# Patient Record
Sex: FEMALE | Race: BLACK OR AFRICAN AMERICAN | Employment: FULL TIME | URBAN - METROPOLITAN AREA
[De-identification: names, ages, dates, MRNs, and addresses within clinical notes are randomized per-mention and may not be internally consistent; named-entity substitution may affect disease eponyms.]

---

## 2022-04-28 ENCOUNTER — HOSPITAL ENCOUNTER (EMERGENCY)
Age: 22
Discharge: HOME | End: 2022-04-28
Payer: COMMERCIAL

## 2022-04-28 VITALS
DIASTOLIC BLOOD PRESSURE: 72 MMHG | SYSTOLIC BLOOD PRESSURE: 115 MMHG | HEART RATE: 98 BPM | RESPIRATION RATE: 16 BRPM | WEIGHT: 163.8 LBS | TEMPERATURE: 98.4 F | OXYGEN SATURATION: 100 %

## 2022-04-28 DIAGNOSIS — N63.0 BENIGN BREAST LUMPS: Primary | ICD-10-CM

## 2022-04-28 LAB — HCG(URINE) PREGNANCY TEST: NEGATIVE

## 2022-04-28 PROCEDURE — 99283 EMERGENCY DEPT VISIT LOW MDM: CPT

## 2022-04-28 PROCEDURE — 84703 CHORIONIC GONADOTROPIN ASSAY: CPT

## 2022-04-28 RX ORDER — IBUPROFEN 600 MG/1
600 TABLET ORAL EVERY 6 HOURS PRN
Qty: 20 TABLET | Refills: 0 | Status: SHIPPED | OUTPATIENT
Start: 2022-04-28

## 2022-04-28 ASSESSMENT — PAIN DESCRIPTION - LOCATION: LOCATION: BREAST

## 2022-04-28 ASSESSMENT — PAIN SCALES - GENERAL: PAINLEVEL_OUTOF10: 6

## 2022-04-28 ASSESSMENT — PAIN DESCRIPTION - ONSET: ONSET: ON-GOING

## 2022-04-28 ASSESSMENT — ENCOUNTER SYMPTOMS
COLOR CHANGE: 0
SHORTNESS OF BREATH: 0
CHEST TIGHTNESS: 0

## 2022-04-28 ASSESSMENT — PAIN DESCRIPTION - PAIN TYPE: TYPE: ACUTE PAIN

## 2022-04-28 ASSESSMENT — PAIN DESCRIPTION - ORIENTATION: ORIENTATION: LEFT;RIGHT

## 2022-04-28 ASSESSMENT — PAIN - FUNCTIONAL ASSESSMENT: PAIN_FUNCTIONAL_ASSESSMENT: ACTIVITIES ARE NOT PREVENTED

## 2022-04-28 ASSESSMENT — PAIN DESCRIPTION - DESCRIPTORS: DESCRIPTORS: SHARP

## 2022-04-28 ASSESSMENT — PAIN DESCRIPTION - FREQUENCY: FREQUENCY: INTERMITTENT

## 2022-04-29 NOTE — ED PROVIDER NOTES
[address] [facility]      Pt Name: George Montoya  MRN: [id number]  [place] 2000  Date of evaluation: 4/28/2022  Provider: BAM Diaz    This patient was not seen and evaluated by the attending physician No att. providers found. CHIEF COMPLAINT       Chief Complaint   Patient presents with    Breast Mass     Las lumps in [facility]; states she noticed them getting bigger at the beginning of the week; states she also had intermittent sharp pain in both breasts       CRITICAL CARE TIME   I performed a total Critical Care time of 15 minutes, excluding separately reportable procedures. There was a high probability of clinically significant/life threatening deterioration in the patient's condition which required my urgent intervention. Not limited to multiple reexaminations, discussions with attending physician and consultants. HISTORY OF PRESENT ILLNESS  (Location/Symptom, Timing/Onset, Context/Setting, Quality, Duration, Modifying Factors, Severity.)   George Montoya is a 22 y.o. female with no significant PMHx who presents via private vehicle  to the emergency department with breast masses. Pt says the breast masses have been present for years. But has never received medical attention for it. Recently she has been having sharp pain in the breasts associated with the masses which prompted her ED visit today. The mass on the right breast has become bigger than the left recently. Pt denies discharge, skin and nipple changes to the breast, fever, chills, n/v, diaphoresis. Pt says she was supposed to have a menstrual cycle around this time but has not yet. Pain 6/10. She has not taken anything at home for the pain. Nursing Notes were reviewed and I agree.     REVIEW OF SYSTEMS    (2-9 systems for level 4, 10 or more for level 5)     Review of Systems   Constitutional: Negative for activity change, chills, diaphoresis, fatigue and fever.   Respiratory: Negative for chest tightness and shortness of breath. Cardiovascular: Positive for chest pain (Breast pain associated with lumps). Negative for palpitations. Musculoskeletal: Negative for myalgias. Skin: Negative for color change, pallor, rash and wound. Neurological: Negative for weakness. Psychiatric/Behavioral: Negative for agitation and behavioral problems. Except as noted above the remainder of the review of systems was reviewed and negative. PAST MEDICAL HISTORY   No past medical history on file. SURGICAL HISTORY     No past surgical history on file. CURRENT MEDICATIONS       Previous Medications    No medications on file       ALLERGIES     Patient has no known allergies. FAMILY HISTORY     No family history on file. No family status information on file. SOCIAL HISTORY          PHYSICAL EXAM    (up to 7 for level 4, 8 or more for level 5)     ED Triage Vitals [04/28/22 2015]   BP Temp Temp Source Pulse Resp SpO2 Height Weight   115/72 -- Oral 125 16 100 % -- 163 lb 12.8 oz (74.3 kg)       Physical Exam  Constitutional:       General: She is not in acute distress. HENT:      Head: Normocephalic and atraumatic. Cardiovascular:      Rate and Rhythm: Normal rate and regular rhythm. Heart sounds: No murmur heard. No friction rub. No gallop. Pulmonary:      Effort: Pulmonary effort is normal. No respiratory distress. Breath sounds: No wheezing, rhonchi or rales. Chest:      Chest wall: Tenderness present. Breasts: Vini Score is 4. Breasts are symmetrical.      Right: Mass present. No swelling, bleeding, inverted nipple, nipple discharge, skin change, tenderness or axillary adenopathy. Left: Mass present. No swelling, bleeding, inverted nipple, nipple discharge, skin change, tenderness or axillary adenopathy. Lymphadenopathy:      Upper Body:      Right upper body: No axillary adenopathy.       Left upper body: No axillary adenopathy. Skin:     General: Skin is warm. Capillary Refill: Capillary refill takes less than 2 seconds. Coloration: Skin is not cyanotic, jaundiced or pale. Neurological:      General: No focal deficit present. Mental Status: She is alert and oriented to person, place, and time. Psychiatric:         Mood and Affect: Mood normal.         Behavior: Behavior normal.         DIAGNOSTIC RESULTS     NONE    LABS:  Labs Reviewed   PREGNANCY, URINE       All other labs were within normal range or not returned as of this dictation. EMERGENCY DEPARTMENT COURSE and DIFFERENTIAL DIAGNOSIS/MDM:   Vitals:    Vitals:    04/28/22 2015 04/28/22 2045 04/28/22 2059   BP: 115/72     Pulse: 125  98   Resp: 16     Temp:  98.4 °F (36.9 °C)    TempSrc: Oral     SpO2: 100%     Weight: 163 lb 12.8 oz (74.3 kg)       I discussed with Richie Lord and/or family the exam results, diagnosis, care, prognosis, reasons to return and the importance of follow up. Patient and/or family is in full agreement with plan and all questions have been answered. Specific discharge instructions explained, including reasons to return to the emergency department. Richie Lord is well appearing, non-toxic, and afebrile at the time of discharge. Patient has several fibrocystic like lumps to the breast bilaterally. Larger and more painful in the right. There is no nipple discharge no skin changes. She states that she is really had issues similar to this for years. Instructed to follow-up with primary care and have ultrasound imaging and/or mammogram and return for new, worsening or other concerns otherwise take NSAIDs as needed for pain. CONSULTS:  None    PROCEDURES:  Procedures      FINAL IMPRESSION      1.  Benign breast lumps          DISPOSITION/PLAN   DISPOSITION        PATIENT REFERRED TO:  [facility]) Pre-Services  [phone]          DISCHARGE MEDICATIONS:  New Prescriptions    IBUPROFEN (IBU) 600 MG TABLET Take 1 tablet by mouth every 6 hours as needed for Pain       (Please note that portions of this note were completed with a voice recognition program.  Efforts were made to edit the dictations but occasionally words are mis-transcribed.)    BAM Fink [place]  04/28/22 2104       Delores Finkma  04/28/22 2105

## 2022-04-29 NOTE — ED NOTES
D/C: Order noted for d/c. Pt confirmed d/c paperwork  have correct name. Discharge and education instructions reviewed with patient. Teach-back successful. Pt verbalized understanding and signed d/c papers. Pt denied questions at this time. No acute distress noted. Patient instructed to follow-up as noted - return to emergency department if symptoms worsen. Patient verbalized understanding. Discharged per EDMD with discharge instructions. Pt discharged to private vehicle. Patient stable upon departure. Thanked patient for choosing [facility]) for care.               Ninfa Little RN  04/28/22 2467